# Patient Record
Sex: MALE | Race: WHITE
[De-identification: names, ages, dates, MRNs, and addresses within clinical notes are randomized per-mention and may not be internally consistent; named-entity substitution may affect disease eponyms.]

---

## 2021-11-16 LAB
ALBUMIN SERPL BCP-MCNC: 3.8 G/DL (ref 3.4–5)
ALP SERPL-CCNC: 58 U/L (ref 45–117)
ALT SERPL W P-5'-P-CCNC: 39 U/L (ref 12–78)
AMYLASE SERPL-CCNC: 35 U/L (ref 25–115)
AST SERPL W P-5'-P-CCNC: 22 U/L (ref 15–37)
BUN BLD-MCNC: 13 MG/DL (ref 7–18)
GLUCOSE SERPLBLD-MCNC: 103 MG/DL (ref 74–106)
HCT VFR BLD CALC: 45.1 % (ref 39.6–49)
LIPASE SERPL-CCNC: 79 U/L (ref 73–393)
LYMPHOCYTES # SPEC AUTO: 2.4 K/UL (ref 0.7–4.9)
PMV BLD: 9 FL (ref 7.6–11.3)
POTASSIUM SERPL-SCNC: 3.7 MMOL/L (ref 3.5–5.1)
RBC # BLD: 5.09 M/UL (ref 4.33–5.43)

## 2021-11-16 NOTE — RAD REPORT
EXAM DESCRIPTION:  RAD - Chest Pa And Lat (2 Views) - 11/16/2021 3:01 pm

 

CLINICAL HISTORY:  PRE

 

COMPARISON:  No comparisons

 

FINDINGS:  Lines: None.

Lungs: No evidence of edema or pneumonia.

Pleural: No significant pleural effusions or pneumothorax.

Cardiac: The heart size is within normal limits.

Bones: No acute fractures.

Other:

 

IMPRESSION:  No acute cardiopulmonary disease.

## 2021-11-17 ENCOUNTER — HOSPITAL ENCOUNTER (OUTPATIENT)
Dept: HOSPITAL 97 - OR | Age: 41
Discharge: HOME | End: 2021-11-17
Attending: SURGERY
Payer: COMMERCIAL

## 2021-11-17 VITALS — SYSTOLIC BLOOD PRESSURE: 123 MMHG | TEMPERATURE: 97.6 F | DIASTOLIC BLOOD PRESSURE: 72 MMHG | OXYGEN SATURATION: 99 %

## 2021-11-17 DIAGNOSIS — R10.13: ICD-10-CM

## 2021-11-17 DIAGNOSIS — R10.11: ICD-10-CM

## 2021-11-17 DIAGNOSIS — Z20.822: ICD-10-CM

## 2021-11-17 DIAGNOSIS — K80.10: Primary | ICD-10-CM

## 2021-11-17 PROCEDURE — 82150 ASSAY OF AMYLASE: CPT

## 2021-11-17 PROCEDURE — 47562 LAPAROSCOPIC CHOLECYSTECTOMY: CPT

## 2021-11-17 PROCEDURE — 0FT44ZZ RESECTION OF GALLBLADDER, PERCUTANEOUS ENDOSCOPIC APPROACH: ICD-10-PCS

## 2021-11-17 PROCEDURE — 36415 COLL VENOUS BLD VENIPUNCTURE: CPT

## 2021-11-17 PROCEDURE — 80076 HEPATIC FUNCTION PANEL: CPT

## 2021-11-17 PROCEDURE — 80048 BASIC METABOLIC PNL TOTAL CA: CPT

## 2021-11-17 PROCEDURE — 85025 COMPLETE CBC W/AUTO DIFF WBC: CPT

## 2021-11-17 PROCEDURE — 88304 TISSUE EXAM BY PATHOLOGIST: CPT

## 2021-11-17 PROCEDURE — 71046 X-RAY EXAM CHEST 2 VIEWS: CPT

## 2021-11-17 PROCEDURE — 83690 ASSAY OF LIPASE: CPT

## 2021-11-17 PROCEDURE — 93005 ELECTROCARDIOGRAM TRACING: CPT

## 2021-11-17 NOTE — P.BOP
Preoperative diagnosis: acute cholecystitis, symptomatic cholelithiasis


Postoperative diagnosis: same, intrabdominal adhesions


Primary procedure: Laparoscopic cholecystectomy


Assistant: FABY CARRIZALES (RNFA)


Estimated blood loss: <10cc


Specimen: gb


Findings: see dicta


Anesthesia: General


Complications: None


Drain(s): YVES drain


Transferred to: Recovery Room


Condition: Good

## 2021-11-17 NOTE — DS
Diagnoses:  Acute cholecystitis, symptomatic cholelithiasis, intraabdominal adhesions.



Procedures:  Laparoscopic cholecystectomy and lysis of adhesions.



Disposition:  Home.



Activity:  As tolerated.  No heavy lifting.



Plan:  Follow up in my office in 1 week.  Call for appointment at [phone].  Keep area dry for 48 ralph
rs, then may shower.  YVES drain record output q.24 hours.



Medications:  Include Augmentin __________ p.o. q.12, Tylenol No.3 q.4 hours p.r.n. pain.





YANELI/LEWIS

DD:  11/17/2021 12:08:23Voice ID:  [id number]

DT:  11/17/2021 12:36:37Report ID:  [id number]

## 2021-11-17 NOTE — OP
Date of Procedure:  11/17/2021



Surgeon:  August Coughlin MD



Assistant:  RENETTA Hunt.



Preoperative Diagnoses:  Acute cholecystitis, right upper quadrant abdominal pain, symptomatic cholel
ithiasis.



Postoperative Diagnoses:  Acute cholecystitis, right upper quadrant abdominal pain, symptomatic ursula
lithiasis plus intraabdominal adhesions.



Procedures:  Laparoscopic cholecystectomy with laparoscopic lysis of adhesions.



Estimated Blood Loss:  Less than 10 mL.



Specimen:  Gallbladder.



Findings:  The patient has multiple adhesions of omentum to the gallbladder.  In order for us to leav
e them, we have to use the LigaSure device.  Probably related to previous cholecystitis in this patie
nt.



Anesthesia:  General plus local.



Drains:  YVES drain #10.



Indication:  This is the case of a 41-year-old patient, comes to us with recurrent epigastric upper q
uadrant pain, diagnosed with cholecystitis, symptomatic cholelithiasis.  The benefits, alternatives, 
and risks of laparoscopic possible open cholecystectomy fully explained, which include, but not limit
ed to infection, bleeding, damage to adjacent structures, anesthesia complication, choledocholithiasi
s, bile leak, pancreatitis, MI, and even death.  He also understands this may not relieve any symptom
s.  He might need more than one surgical intervention.  He understood, signed a consent.



Procedure In Detail:  The patient was brought to the operating room, placed in supine position.  Anes
thesia was done without complication.  Abdominal area was prepped and draped in the usual sterile fas
hion.  Marcaine 0.5% was injected for local anesthetic followed by sharp incision of the skin in the 
infraumbilical region.  Incision was carried down to fascia, which was opened under direct vision.  P
eritoneum was encountered, opened under direct vision.  Vicryl #1 placed inside the fascia.  Ashley t
rocar was carefully introduced.  Pneumoperitoneum was obtained.  When we put the camera in that area,
 we noticed a lot of omental adhesions to the gallbladder and to the liver itself.  It cannot be modesta
angelo safely without removing these adhesions.  So, I used and opened the LigaSure device and carefully
 tried to remove these adhesions without causing any enterotomy or bleeding.  We did that in a sequen
tial fashion to make sure we exposed the gallbladder as we go through.  Once we have those adhesions 
removed, we proceeded then to visualize the area of the gallbladder, put a grasper in the fundus of t
he gallbladder, another grasper in the infundibulum retracting the gallbladder in the inferolateral f
ashion, exposing the triangle of Calot, obtaining critical view.  Cystic duct and cystic artery were 
clearly isolated, free circumferentially and a connection between those and the gallbladder was clear
ly identified.  I proceeded to ligate those by using at least 3 clips proximal, 1 clip distal, ligati
on in middle.  Same was done with the cystic artery.  No bile leak.  No bleeding.  The gallbladder wa
s removed from liver using Bovie cauterizer and removed from abdominal cavity using EndoCatch through
 umbilical incision.  The area was inspected once again.  No bile leak, no bleeding.  The area of the
 lysis of adhesions also inspected.  Since we have all that inflammation in that area, I proceeded to
 leave a YVES drain in that region exiting to 1 of the trocar sites and securing that in place with 3-0
 nylon.  At that moment, I proceeded then to remove the trocars under direct vision.  Deflated the pn
eumoperitoneum.  Closed the fascia with #1 Vicryl.  Irrigated the subcutaneous tissue, closed that wi
th 3-0 chromic, and skin with staples.  Sponge count and instrument counts correct.  The patient tole
rated the procedure well.  The patient was sent to recovery in stable condition.





YANELI/LEWIS

DD:  11/17/2021 12:08:23Voice ID:  [id number]

DT:  11/17/2021 12:34:35Report ID:  [id number]